# Patient Record
Sex: FEMALE | Race: WHITE | NOT HISPANIC OR LATINO | Employment: FULL TIME | URBAN - METROPOLITAN AREA
[De-identification: names, ages, dates, MRNs, and addresses within clinical notes are randomized per-mention and may not be internally consistent; named-entity substitution may affect disease eponyms.]

---

## 2017-01-25 ENCOUNTER — HISTORICAL (OUTPATIENT)
Dept: LAB | Facility: HOSPITAL | Age: 37
End: 2017-01-25

## 2017-03-03 ENCOUNTER — HISTORICAL (OUTPATIENT)
Dept: LAB | Facility: HOSPITAL | Age: 37
End: 2017-03-03

## 2017-12-27 ENCOUNTER — HISTORICAL (OUTPATIENT)
Dept: SURGERY | Facility: HOSPITAL | Age: 37
End: 2017-12-27

## 2017-12-27 LAB — POC BETA-HCG (QUAL): NEGATIVE

## 2018-02-01 ENCOUNTER — HISTORICAL (OUTPATIENT)
Dept: LAB | Facility: HOSPITAL | Age: 38
End: 2018-02-01

## 2018-02-01 LAB
ALBUMIN SERPL-MCNC: 3.6 GM/DL (ref 3.4–5)
ALBUMIN/GLOB SERPL: 0.9 RATIO (ref 1.1–2)
ALP SERPL-CCNC: 101 UNIT/L (ref 38–126)
ALT SERPL-CCNC: 84 UNIT/L (ref 12–78)
AST SERPL-CCNC: 44 UNIT/L (ref 15–37)
BILIRUB SERPL-MCNC: 0.4 MG/DL (ref 0.2–1)
BILIRUBIN DIRECT+TOT PNL SERPL-MCNC: 0.1 MG/DL (ref 0–0.5)
BILIRUBIN DIRECT+TOT PNL SERPL-MCNC: 0.3 MG/DL (ref 0–0.8)
BUN SERPL-MCNC: 17 MG/DL (ref 7–18)
CALCIUM SERPL-MCNC: 9 MG/DL (ref 8.5–10.1)
CHLORIDE SERPL-SCNC: 102 MMOL/L (ref 98–107)
CO2 SERPL-SCNC: 31 MMOL/L (ref 21–32)
CREAT SERPL-MCNC: 0.64 MG/DL (ref 0.55–1.02)
GLOBULIN SER-MCNC: 3.8 GM/DL (ref 2.4–3.5)
GLUCOSE SERPL-MCNC: 83 MG/DL (ref 74–106)
POTASSIUM SERPL-SCNC: 3.7 MMOL/L (ref 3.5–5.1)
PROT SERPL-MCNC: 7.4 GM/DL (ref 6.4–8.2)
SODIUM SERPL-SCNC: 137 MMOL/L (ref 136–145)

## 2019-02-06 ENCOUNTER — HISTORICAL (OUTPATIENT)
Dept: ADMINISTRATIVE | Facility: HOSPITAL | Age: 39
End: 2019-02-06

## 2019-02-06 LAB
ALBUMIN SERPL-MCNC: 3.3 GM/DL (ref 3.4–5)
ALBUMIN/GLOB SERPL: 0.9 {RATIO}
ALP SERPL-CCNC: 79 UNIT/L (ref 38–126)
ALT SERPL-CCNC: 28 UNIT/L (ref 12–78)
AST SERPL-CCNC: 22 UNIT/L (ref 15–37)
BILIRUB SERPL-MCNC: 0.5 MG/DL (ref 0.2–1)
BILIRUBIN DIRECT+TOT PNL SERPL-MCNC: 0.1 MG/DL (ref 0–0.2)
BILIRUBIN DIRECT+TOT PNL SERPL-MCNC: 0.4 MG/DL (ref 0–0.8)
BUN SERPL-MCNC: 21 MG/DL (ref 7–18)
CALCIUM SERPL-MCNC: 8.6 MG/DL (ref 8.5–10.1)
CHLORIDE SERPL-SCNC: 104 MMOL/L (ref 98–107)
CO2 SERPL-SCNC: 31 MMOL/L (ref 21–32)
CREAT SERPL-MCNC: 0.75 MG/DL (ref 0.55–1.02)
GLOBULIN SER-MCNC: 3.6 GM/DL (ref 2.4–3.5)
GLUCOSE SERPL-MCNC: 94 MG/DL (ref 74–106)
POTASSIUM SERPL-SCNC: 3.3 MMOL/L (ref 3.5–5.1)
PROT SERPL-MCNC: 6.9 GM/DL (ref 6.4–8.2)
SODIUM SERPL-SCNC: 143 MMOL/L (ref 136–145)

## 2019-02-22 ENCOUNTER — HISTORICAL (OUTPATIENT)
Dept: ADMINISTRATIVE | Facility: HOSPITAL | Age: 39
End: 2019-02-22

## 2019-02-22 LAB — POTASSIUM SERPL-SCNC: 3.6 MMOL/L (ref 3.5–5.1)

## 2021-11-10 ENCOUNTER — HISTORICAL (OUTPATIENT)
Dept: ADMINISTRATIVE | Facility: HOSPITAL | Age: 41
End: 2021-11-10

## 2021-11-10 LAB
ABS NEUT (OLG): 2.5 X10(3)/MCL (ref 2.1–9.2)
ALBUMIN SERPL-MCNC: 4.3 GM/DL (ref 3.4–5)
ALBUMIN/GLOB SERPL: 1.43 {RATIO} (ref 1.5–2.5)
ALP SERPL-CCNC: 67 UNIT/L (ref 38–126)
ALT SERPL-CCNC: 19 UNIT/L (ref 7–52)
AST SERPL-CCNC: 21 UNIT/L (ref 15–37)
BILIRUB SERPL-MCNC: 0.3 MG/DL (ref 0.2–1)
BILIRUBIN DIRECT+TOT PNL SERPL-MCNC: 0.1 MG/DL (ref 0–0.5)
BILIRUBIN DIRECT+TOT PNL SERPL-MCNC: 0.2 MG/DL
BUN SERPL-MCNC: 13 MG/DL (ref 7–18)
CALCIUM SERPL-MCNC: 9.3 MG/DL (ref 8.5–10)
CHLORIDE SERPL-SCNC: 101 MMOL/L (ref 98–107)
CO2 SERPL-SCNC: 29 MMOL/L (ref 21–32)
CREAT SERPL-MCNC: 0.73 MG/DL (ref 0.6–1.3)
DEPRECATED CALCIDIOL+CALCIFEROL SERPL-MC: 27.7 NG/ML (ref 30–80)
ERYTHROCYTE [DISTWIDTH] IN BLOOD BY AUTOMATED COUNT: 14.7 % (ref 11.5–17)
GLOBULIN SER-MCNC: 3 GM/DL (ref 1.2–3)
GLUCOSE SERPL-MCNC: 97 MG/DL (ref 74–106)
HCT VFR BLD AUTO: 38.5 % (ref 37–47)
HGB BLD-MCNC: 12.2 GM/DL (ref 12–16)
LYMPHOCYTES # BLD AUTO: 1.9 X10(3)/MCL (ref 0.6–3.4)
LYMPHOCYTES NFR BLD AUTO: 39.5 % (ref 13–40)
MCH RBC QN AUTO: 24.4 PG (ref 27–31.2)
MCHC RBC AUTO-ENTMCNC: 32 GM/DL (ref 32–36)
MCV RBC AUTO: 77 FL (ref 80–94)
MONOCYTES # BLD AUTO: 0.4 X10(3)/MCL (ref 0.1–1.3)
MONOCYTES NFR BLD AUTO: 7.6 % (ref 0.1–24)
NEUTROPHILS NFR BLD AUTO: 52.9 % (ref 47–80)
PLATELET # BLD AUTO: 368 X10(3)/MCL (ref 130–400)
PMV BLD AUTO: 8.4 FL (ref 9.4–12.4)
POTASSIUM SERPL-SCNC: 4.2 MMOL/L (ref 3.5–5.1)
PROT SERPL-MCNC: 7.3 GM/DL (ref 6.4–8.2)
RBC # BLD AUTO: 4.99 X10(6)/MCL (ref 4.2–5.4)
SODIUM SERPL-SCNC: 141 MMOL/L (ref 136–145)
T PALLIDUM AB SER QL: NONREACTIVE
T3FREE SERPL-MCNC: 3.08 PG/ML (ref 1.45–3.48)
T4 FREE SERPL-MCNC: 1.15 NG/DL (ref 0.76–1.46)
TSH SERPL-ACNC: 1.35 MIU/ML (ref 0.35–4.94)
VIT B12 SERPL-MCNC: 925 PG/ML (ref 213–816)
WBC # SPEC AUTO: 4.8 X10(3)/MCL (ref 4.5–11.5)

## 2021-12-11 LAB — EST CREAT CLEARANCE SER (OHS): 108.85 ML/MIN

## 2022-04-10 ENCOUNTER — HISTORICAL (OUTPATIENT)
Dept: ADMINISTRATIVE | Facility: HOSPITAL | Age: 42
End: 2022-04-10

## 2022-04-26 VITALS
SYSTOLIC BLOOD PRESSURE: 126 MMHG | BODY MASS INDEX: 34.38 KG/M2 | WEIGHT: 226.88 LBS | DIASTOLIC BLOOD PRESSURE: 82 MMHG | HEIGHT: 68 IN

## 2022-05-02 NOTE — HISTORICAL [facility] CERNER
This is a historical note converted from Jossie. Formatting and pictures may have been removed.  Please reference Jossie for original formatting and attached multimedia. Chief Complaint  hair loss post covid  History of Present Illness  41-year-old  female presents office today?with complaints of hair loss.? She expresses that?a number of months ago/over the summer?she did contract COVID-19.? She expresses that?since that time?she has been under?a large amount stress/anxiety.? Also currently?experiencing?an increase in total body weight.? In May 2019?weight and at 75.8 kg?and currently?to date weighing in at 101.2 kg.? She expresses that?her amount of hair loss has been?significant over the last?4-8 weeks.? Attributes this to?numerous?potential variables including not limited to stress, possible?vitamin deficiency, post Covid?viral infection.  She expresses that?she carries a strong family history (primarily mother) of?mental health issue/stress/anxiety?requiring numerous/multiple?medications.? She adamantly expresses that?she does not want to start down the road/my mother?is on all kinds of?psych meds?and I do not?want to be like that. ?Also,?having?increased/elevated blood pressure and heart rate.? No chest pain shortness breath gasping for air etc.  Concerning her stress/anxiety,?she expresses that?she is under a large amount of stress?at her place of employment.? Frequently finds?that she has the inability to turn my mind off.? Experiencing?severe amount of fatigue?associated with stress etc.  Review of Systems  ?14 point Review of Systems performed with no exceptions for new complaints other than as noted in HPI.  Physical Exam  Vitals & Measurements  HR:?88(Peripheral)? BP:?208/110?  HT:?172.72?cm? HT:?172.72?cm? WT:?101.200?kg? WT:?101.2?kg? BMI:?33.92?  Well developed, well nourished, NAD, alert and oriented x4  Vitals reviewed  Head: NCAT  Eyes: EOMI, conjunctiva WNL, pupils symmetric  Neck:  supple, FROM, no LA, no thyromegaly, no bruits auscultated  CV: RRR no MRG, peripheral pulses intact  Pulmonary: Effort normal and breath sounds normal, no wheeze  Abdomen: soft, NTND, no HSM; ? NABS; no peritoneal signs ? ??  Musculoskeletal: ?no tenderness, joints wnl for acute changes, FROM, no CCE  Skin: warm, dry, intact  Neuro: no motor/sensory deficits, cranial nerves grossly intact, cerebellar function appears intact  Lymphadenopathy: no abnormalities noted  Psychiatric: Cooperative, appropriate mood and affect, appears to have normal judgment  ?  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 Handout/educational handout?provided at conclusion of examination?pertaining to Telogen hair loss.  2.?Hair loss?L65.9  ?We will begin initial work-up/blood work?for?acute hair loss.? CBC, CMP,?TSH, vitamin D level, vitamin B12 level, zinc level,?OTTO, RPR, TPO, free T3?and T4.  Highly suspicious of?stress/anxiety induced hair loss etc.?would likely benefit from improvements in?stress/anxiety.  3.?HTN (hypertension)?I10  ?Verbally adamant that?secondary to?whitecoat syndrome/severe anxiety/stress?as mentioned in HPI.? Educated to?monitor home blood pressure readings twice daily?and keep journal entry log.? Bring journal entries to next office visit for recheck.  4.?Obesity?E66.9  ?Recommend 3-4 days of 30-45 minutes sessions of brisk walking/water aerobics/resistance training as tolerating. ?Recommend limiting excess simple carbohydrates from daily dietary regimen. recommend increase lean protein and vegetable matter.  5.?Anxiety?F41.9  After?lengthy?discussion concerning?her symptoms?and findings?and family history,?would likely?benefit and I highly recommend initiating?prescription medication?for improvement of?stress/anxiety symptoms etc.? Discussed?numerous?medications/classifications of medications?including but not  limited to Zoloft, Lexapro, Trintellix, Effexor, Cymbalta, Pristiq?and many others.? At the conclusion?of this discussion,?it was decided to begin on?Zoloft 50 mg tablet.  ?Educated/instructed on potential side effects of SSRI/ SNRI medications including but not limited to weight gain, sexual dysfunction, worsening of depression, homicidal/suicidal ideation. ?ER precautions given for homicidal/suicidal ideation. ?Verbalized understanding.  Zoloft 50 mg tablet begin with 1/2 tablet?daily each morning?x1-2 weeks then increase to whole tablet daily thereafter. ?Verbalized understanding.? Return to clinic in 4 to 6 weeks for recheck  Total time spent with patient approximately 50 minutes.   Problem List/Past Medical History  Ongoing  Chronic hypertension in pregnancy  Gallstones  HTN (hypertension)  NAFLD (nonalcoholic fatty liver disease)  Obesity  Right otitis media  Historical  chronic hypertension  Pregnant  Pregnant  Pregnant  Procedure/Surgical History  Cholecystectomy Laparoscopic (None) (2017)  Laparoscopy, surgical; cholecystectomy (2017)  Resection of Gallbladder, Percutaneous Endoscopic Approach (2017)   Section (2016)  Extraction of Products of Conception, Low Cervical, Open Approach (2016)   Section (None) (2013)  Low cervical  section (2013)  DIRECT ADMISSION OF PATIENT FOR HOSPITAL OBSERVATION CARE (10/31/2013)  HOSPITAL OBSERVATION SERVICE, PER HOUR (10/31/2013)  appendectomy     Medications  biotin, Oral, Daily  ergocalciferol 50 mcg (2000 intl units) oral capsule, 50 mcg= 1 cap(s), Oral, qWeek, 1 refills  multivitamin with minerals (Adult Tab), 1 tab(s), Oral, Daily  sertraline 50 mg oral tablet, 50 mg= 1 tab(s), Oral, Daily, 1 refills  Allergies  sulfa drugs  Social History  Abuse/Neglect  No, 11/10/2021  Alcohol - Denies Alcohol Use, 10/31/2013  Substance Use - Denies Substance Abuse, 10/31/2013  Tobacco - Denies Tobacco Use,  10/31/2013  Never (less than 100 in lifetime), N/A, 11/10/2021  Family History  Anxiety: Mother and Sister.  Depression.: Mother.  Hypertension.: Mother.  Scoliosis: Mother.  Immunizations  Vaccine Date Status   COVID-19 MRNA, LNP-S, PF- Pfizer 09/24/2021 Given   COVID-19 MRNA, LNP-S, PF- Pfizer 09/03/2021 Given   influenza virus vaccine, inactivated 02/06/2019 Given   tetanus/diphth/pertuss (Tdap) adult/adol 01/02/2014 Recorded   Health Maintenance  Health Maintenance  ???Pending?(in the next year)  ??? ??OverDue  ??? ? ? ?Hypertension Maintenance-Medication Prescribed due??02/26/19??and every 1??year(s)  ??? ? ? ?Alcohol Misuse Screening due??01/02/21??and every 1??year(s)  ??? ??Due?  ??? ? ? ?ADL Screening due??11/16/21??and every 1??year(s)  ??? ? ? ?Depression Screening due??11/16/21??Unknown Frequency  ??? ? ? ?Hypertension Management-Education due??11/16/21??and every 1??year(s)  ??? ??Due In Future?  ??? ? ? ?Obesity Screening not due until??01/01/22??and every 1??year(s)  ??? ? ? ?Blood Pressure Screening not due until??11/10/22??and every 1??year(s)  ??? ? ? ?Body Mass Index Check not due until??11/10/22??and every 1??year(s)  ??? ? ? ?Hypertension Management-Blood Pressure not due until??11/10/22??and every 1??year(s)  ??? ? ? ?Hypertension Management-BMP not due until??11/10/22??and every 1??year(s)  ???Satisfied?(in the past 1 year)  ??? ??Satisfied?  ??? ? ? ?Blood Pressure Screening on??11/10/21.??Satisfied by Danielle Don LPN  ??? ? ? ?Body Mass Index Check on??11/10/21.??Satisfied by Danielle Don LPN  ??? ? ? ?Cervical Cancer Screening on??04/01/21.??Satisfied by Zandra MADERA CT(ASCP)Li  ??? ? ? ?Diabetes Screening on??11/10/21.??Satisfied by Wesley Merchant  ??? ? ? ?Hypertension Management-BMP on??11/10/21.??Satisfied by Wesley Merchant  ??? ? ? ?Obesity Screening on??11/10/21.??Satisfied by Danielle Don LPN  ?      Physician?was in the building when patient was?seen and examined by the nurse  practitioner.? I concur with the?assessment/plan/management?of the patient.

## 2022-05-11 PROBLEM — I10 HYPERTENSION: Status: ACTIVE | Noted: 2022-05-11

## 2022-05-11 PROBLEM — K76.0 NONALCOHOLIC FATTY LIVER DISEASE: Status: ACTIVE | Noted: 2022-05-11

## 2022-05-11 PROBLEM — K80.20 CALCULUS OF GALLBLADDER: Status: ACTIVE | Noted: 2022-05-11

## 2022-05-11 PROBLEM — G93.89 ENCEPHALOMALACIA: Status: ACTIVE | Noted: 2022-05-11

## 2022-05-11 PROBLEM — E55.9 VITAMIN D DEFICIENCY: Status: ACTIVE | Noted: 2022-05-11

## 2023-04-20 ENCOUNTER — HOSPITAL ENCOUNTER (OUTPATIENT)
Dept: RADIOLOGY | Facility: HOSPITAL | Age: 43
Discharge: HOME | End: 2023-04-20
Attending: OBSTETRICS & GYNECOLOGY
Payer: COMMERCIAL

## 2023-04-20 DIAGNOSIS — Z12.31 ENCOUNTER FOR SCREENING MAMMOGRAM FOR BREAST CANCER: ICD-10-CM

## 2023-04-20 PROCEDURE — 77063 BREAST TOMOSYNTHESIS BI: CPT | Mod: 26,,, | Performed by: RADIOLOGY

## 2023-04-20 PROCEDURE — 77067 MAMMO DIGITAL SCREENING BILAT WITH TOMO: ICD-10-PCS | Mod: 26,,, | Performed by: RADIOLOGY

## 2023-04-20 PROCEDURE — 77067 SCR MAMMO BI INCL CAD: CPT | Mod: 26,,, | Performed by: RADIOLOGY

## 2023-04-20 PROCEDURE — 77067 SCR MAMMO BI INCL CAD: CPT | Mod: TC

## 2023-04-20 PROCEDURE — 77063 MAMMO DIGITAL SCREENING BILAT WITH TOMO: ICD-10-PCS | Mod: 26,,, | Performed by: RADIOLOGY

## 2023-10-23 NOTE — OP NOTE
Patient:   Mayra Gibson             MRN: [id number]            FIN: [id number]               Age:   37 years     Sex:  Female     :  1980   Associated Diagnoses:   None   Author:   Alex Beckman MD            DATE OF SURGERY:   17    SURGEON:  Alex Beckman MD    PREOPERATIVE DIAGNOSIS:  Symptomatic Cholelithiasis / Chronic Cholecystitis    POST OPERATIVE DIAGNOSIS:  Same.    PROCEDURE:  Laparoscopic cholecystectomy.    ANESTHESIA:  General endotracheal anesthesia.    ESTIMATED BLOOD LOSS:  Approximately 20 cc.   Blood administered, none.    LAP AND INSTRUMENT COUNT:  Correct X2 at the end of the case.    SPECIMENS:  Gallbladder.    INDICATION AND SIGNIFICANT HISTORY:  Patient is a 37-year-old female complaining of post prandial right upper quadrant pain associated with fatty meals.  Patient was worked up clincally and found to have symptomatic cholelithiasis.  Risks and benefits of surgery was discussed with the patient who voiced understanding of risks / benefits and elected to proceed with surgery.        PROCEDURE IN DETAIL:  Once informed consents were obtained, patient was taken to the operating room and placed supine on the operating table.  After general endotracheal anesthesia was induced, the abdomen was prepped and draped in the standard sterile surgical fashion.  Periumbilical incision was made with the scalpel and the Veress needle was used to enter the abdominal cavity.  Pneumoperitoneum was then created with insufflation.  After adequate insufflation was obtained, 11 millimeter trocar port were placed through this wound.  Two additional 5 millimeter ports were placed in the right upper quadrant followed by 5 millimeter trocar placed subxiphoid.  The gallbladder was then visualized appeared to have chronic inflammatory changes and retracted both superiorly and laterally to achieve the critical view.  Dissection was carried out in lateral to medial fashion.  The cystic duct  were first visualized followed by cystic artery appropriate.  Two clips were placed twice proximally on each structure and one distally and resected.  The gallbladder was then removed from the liver bed using the hook cautery and passed off the table as specimen through the periumbilical trocar port site.  Attention was then redirected to the gallbladder fossa, no active bleeding was noted.  Good hemostasis was visualized.  All ports were then removed under direct visualization with no active bleeding noted.  Skin was then closed with 4-0 Vicryl suture in interrupted fashion.  Skin was  cleaned and dry sterile dressing was placed on the wound.  Lap and instrument  counts were correct X2 at the end of the case.  Patient tolerated the procedure well and was transferred to recovery room in good condition.       V-Y Flap Text: Given the location of the defect, shape of the defect and the proximity to free margins a V-Y flap was deemed most appropriate.  Using a sterile surgical marker, an appropriate advancement flap was drawn incorporating the defect and placing the expected incisions within the relaxed skin tension lines where possible.    The area thus outlined was incised deep to adipose tissue with a #[id number] scalpel blade.  The skin margins were undermined to an appropriate distance in all directions utilizing iris scissors.  Following this, the designed flap was advanced and carried over into the primary defect and sutured into place.

## 2023-11-14 PROBLEM — E66.01 CLASS 3 OBESITY: Status: ACTIVE | Noted: 2023-11-14

## 2023-11-14 PROBLEM — E66.813 CLASS 3 OBESITY: Status: ACTIVE | Noted: 2023-11-14

## 2024-04-22 ENCOUNTER — HOSPITAL ENCOUNTER (OUTPATIENT)
Dept: RADIOLOGY | Facility: HOSPITAL | Age: 44
Discharge: HOME | End: 2024-04-22
Attending: OBSTETRICS & GYNECOLOGY
Payer: COMMERCIAL

## 2024-04-22 DIAGNOSIS — Z12.31 ENCOUNTER FOR SCREENING MAMMOGRAM FOR BREAST CANCER: ICD-10-CM

## 2024-04-22 PROCEDURE — 77063 BREAST TOMOSYNTHESIS BI: CPT | Mod: 26,,, | Performed by: RADIOLOGY

## 2024-04-22 PROCEDURE — 77067 SCR MAMMO BI INCL CAD: CPT | Mod: 26,,, | Performed by: RADIOLOGY

## 2024-04-22 PROCEDURE — 77067 SCR MAMMO BI INCL CAD: CPT | Mod: TC

## 2025-01-14 PROBLEM — E66.01 MORBID (SEVERE) OBESITY DUE TO EXCESS CALORIES: Status: ACTIVE | Noted: 2025-01-14

## 2025-01-14 PROBLEM — Z79.899 HIGH RISK MEDICATION USE: Status: ACTIVE | Noted: 2025-01-14

## 2025-04-23 ENCOUNTER — LAB VISIT (OUTPATIENT)
Dept: LAB | Facility: HOSPITAL | Age: 45
End: 2025-04-23
Attending: OBSTETRICS & GYNECOLOGY
Payer: COMMERCIAL

## 2025-04-23 DIAGNOSIS — E55.9 VITAMIN D DEFICIENCY DISEASE: ICD-10-CM

## 2025-04-23 DIAGNOSIS — E34.9 ENDOCRINE DISORDER RELATED TO PUBERTY: Primary | ICD-10-CM

## 2025-04-23 LAB
25(OH)D3+25(OH)D2 SERPL-MCNC: 35 NG/ML (ref 30–80)
ESTRADIOL SERPL HS-MCNC: 49 PG/ML
FSH SERPL-ACNC: 23.03 MIU/ML
TESTOST SERPL-MCNC: 20.15 NG/DL (ref 13.84–53.35)
TSH SERPL-ACNC: 1.09 UIU/ML (ref 0.35–4.94)

## 2025-04-23 PROCEDURE — 84403 ASSAY OF TOTAL TESTOSTERONE: CPT

## 2025-04-23 PROCEDURE — 82306 VITAMIN D 25 HYDROXY: CPT

## 2025-04-23 PROCEDURE — 84443 ASSAY THYROID STIM HORMONE: CPT

## 2025-04-23 PROCEDURE — 36415 COLL VENOUS BLD VENIPUNCTURE: CPT

## 2025-04-23 PROCEDURE — 82670 ASSAY OF TOTAL ESTRADIOL: CPT

## 2025-04-23 PROCEDURE — 83001 ASSAY OF GONADOTROPIN (FSH): CPT

## 2025-04-29 ENCOUNTER — HOSPITAL ENCOUNTER (OUTPATIENT)
Dept: RADIOLOGY | Facility: HOSPITAL | Age: 45
Discharge: HOME | End: 2025-04-29
Attending: OBSTETRICS & GYNECOLOGY
Payer: COMMERCIAL

## 2025-04-29 DIAGNOSIS — Z12.31 ENCOUNTER FOR SCREENING MAMMOGRAM FOR BREAST CANCER: ICD-10-CM

## 2025-04-29 PROCEDURE — 77067 SCR MAMMO BI INCL CAD: CPT | Mod: 26,,, | Performed by: STUDENT IN AN ORGANIZED HEALTH CARE EDUCATION/TRAINING PROGRAM

## 2025-04-29 PROCEDURE — 77063 BREAST TOMOSYNTHESIS BI: CPT | Mod: 26,,, | Performed by: STUDENT IN AN ORGANIZED HEALTH CARE EDUCATION/TRAINING PROGRAM

## 2025-04-29 PROCEDURE — 77067 SCR MAMMO BI INCL CAD: CPT | Mod: TC
